# Patient Record
Sex: MALE | Race: WHITE
[De-identification: names, ages, dates, MRNs, and addresses within clinical notes are randomized per-mention and may not be internally consistent; named-entity substitution may affect disease eponyms.]

---

## 2018-06-14 NOTE — NUR
06/14/18 1377 Yazmin Davis
1573 PATIENT ARRIVES TO PACU ASLEEP, DOES NOT REPSOND TO PAIN OR
VERBAL STIMULI. RESP EVEN AND UNLABORED, ORAL AIRWAY IN PLACE, MASK AT
6 LITERS.

## 2018-06-14 NOTE — NUR
CHECKED ON PT FOLLOWING SURGERY. HE WAS SOMEWHAT GROGGY, BUT AWAKE ENOUGH TO
KNOW OF MY PRESENCE. GUARDS PRESENT, WILL FOLLOW AS NEEDED

## 2018-06-14 NOTE — NUR
PT UP TO BR W/OFFICER STANDBY. PT REPORTS SUCCESSFUL VOID AND REQ DC HOME. DC
INSTRUCTIONS ARE GIVEN AND PT VERBALIZES UNDERSTANDING. PT GETTING DRESSED
W/ASSIST FROM OFFICERS.

## 2018-06-19 NOTE — OR
[facility]
                                    [address], [place]  [address]
_________________________________________________________________________________________
                                                                 Signed   
 
 
DATE OF OPERATION:
06/14/2018
 
SURGEON:
Parker Huang MD
 
PREOPERATIVE DIAGNOSIS:
Painful retained hardware, right ankle.
 
POSTOPERATIVE DIAGNOSIS:
Painful retained hardware, right ankle.
 
PROCEDURE:
Removal buried screws x2.
 
ANESTHESIA:
General.
 
SPECIMENS AND COMPLICATIONS:
There were no specimens or complications.
 
TOURNIQUET TIME:
About 20 minutes.
 
WHAT WAS DONE:
The patient was taken to the operating room.  After anesthesia was induced and airway
secured, the patient was prepped and draped in the routine sterile fashion.  Fluoroscopy
was used to localize the two distal locking screws of his tibial nail.  Once we had
localized fluoroscopically, a small incision was made over each one.  We then used a tip
of 15-blade to excavate the 1st screw.  There was a little bony overgrowth so we did
need to take a small osteotome and chisel away a little proud bone.  We were then able
to remove the proximal screw without any difficulty.  We then again exposed the 2nd
screw head using the 15-blade, but then again had used a small osteotome to gently
remove some surrounding bone.  Once we had excreted the screw head we again were able to
remove the 2nd screw without any difficulty.  Examination of the screws indicated that
the tips were completely entire screws had been removed.  The wounds were gently
irrigated, closed in standard fashion.  A sterile dressing was applied.  The patient was
awakened to recovery room where he arrived in stable condition.  Following application
of bulky sterile dressings. 
Counts were correct and antibiotic protocols were followed.
 
 
 
    Electronically Signed By: PARKER HUANG MD  06/19/18 0755
_________________________________________________________________________________________
PATIENT NAME:     CRISTOBAL RAMIRES                   
MEDICAL RECORD #: [id number]            OPERATIVE REPORT              
          ACCT #: [id number]  
DATE OF BIRTH:   [birth year]            REPORT #: [id number]      
PHYSICIAN:        PARKER HUANG MD      
PCP:              RONDA MADRID MD              
REPORT IS CONFIDENTIAL AND NOT TO BE RELEASED WITHOUT AUTHORIZATION
 
 
                                  [address] [place]  [address]
_________________________________________________________________________________________
                                                                 Signed   
 
 
 
            ________________________________________
            Parker Huang MD 
 
[facility]
Job #:  [id number]
DD:  06/14/2018 07:51:33
DT:  06/14/2018 09:35:57
 
 
Copies:                                
~
 
 
 
 
 
 
 
 
 
 
 
 
 
 
 
 
 
 
 
 
 
 
 
 
 
 
 
 
 
 
 
    Electronically Signed By: PARKER HUANG MD  06/19/18 0755
_________________________________________________________________________________________
PATIENT NAME:     CRISTOBAL RAMIRES                   
MEDICAL RECORD #: [id number]            OPERATIVE REPORT              
          ACCT #: [id number]  
DATE OF BIRTH:   [birth year]            REPORT #: [id number]      
PHYSICIAN:        PARKER HUANG MD      
PCP:              RONDA MADRID MD              
REPORT IS CONFIDENTIAL AND NOT TO BE RELEASED WITHOUT AUTHORIZATION

## 2021-07-20 ENCOUNTER — HOSPITAL ENCOUNTER (EMERGENCY)
Dept: HOSPITAL 46 - ED | Age: 31
Discharge: HOME | End: 2021-07-20
Payer: COMMERCIAL

## 2021-07-20 VITALS — WEIGHT: 247.01 LBS | BODY MASS INDEX: 33.46 KG/M2 | HEIGHT: 72 IN

## 2021-07-20 DIAGNOSIS — Z87.891: ICD-10-CM

## 2021-07-20 DIAGNOSIS — Z79.4: ICD-10-CM

## 2021-07-20 DIAGNOSIS — Z79.891: ICD-10-CM

## 2021-07-20 DIAGNOSIS — E10.65: Primary | ICD-10-CM

## 2021-07-20 DIAGNOSIS — Z79.899: ICD-10-CM

## 2021-07-20 DIAGNOSIS — Z88.2: ICD-10-CM

## 2022-08-15 ENCOUNTER — HOSPITAL ENCOUNTER (EMERGENCY)
Dept: HOSPITAL 46 - ED | Age: 32
Discharge: HOME | End: 2022-08-15
Payer: COMMERCIAL

## 2022-08-15 VITALS — BODY MASS INDEX: 33.03 KG/M2 | HEIGHT: 72 IN | WEIGHT: 243.83 LBS

## 2022-08-15 DIAGNOSIS — Z79.899: ICD-10-CM

## 2022-08-15 DIAGNOSIS — Z87.891: ICD-10-CM

## 2022-08-15 DIAGNOSIS — G43.909: Primary | ICD-10-CM

## 2022-08-15 DIAGNOSIS — Z88.2: ICD-10-CM

## 2022-08-15 DIAGNOSIS — E10.9: ICD-10-CM

## 2024-11-02 ENCOUNTER — HOSPITAL ENCOUNTER (INPATIENT)
Dept: HOSPITAL 46 - ED | Age: 34
LOS: 2 days | Discharge: NURSING, REHAB OR LONG-TERM CARE | DRG: 310 | End: 2024-11-04
Attending: FAMILY MEDICINE | Admitting: FAMILY MEDICINE
Payer: COMMERCIAL

## 2024-11-02 VITALS — DIASTOLIC BLOOD PRESSURE: 67 MMHG | SYSTOLIC BLOOD PRESSURE: 140 MMHG

## 2024-11-02 VITALS — WEIGHT: 262.79 LBS | HEIGHT: 72 IN | BODY MASS INDEX: 35.59 KG/M2

## 2024-11-02 DIAGNOSIS — Z79.899: ICD-10-CM

## 2024-11-02 DIAGNOSIS — E10.9: ICD-10-CM

## 2024-11-02 DIAGNOSIS — R00.1: ICD-10-CM

## 2024-11-02 DIAGNOSIS — I45.5: Primary | ICD-10-CM

## 2024-11-02 DIAGNOSIS — Z88.2: ICD-10-CM

## 2024-11-02 DIAGNOSIS — Z79.4: ICD-10-CM

## 2024-11-02 DIAGNOSIS — G43.909: ICD-10-CM

## 2024-11-02 LAB
ALBUMIN SERPL-MCNC: 3.4 G/DL (ref 3.4–5)
ALBUMIN/GLOB SERPL: 0.94 {RATIO} (ref 1.1–2.4)
ALP SERPL-CCNC: 60 U/L (ref 46–116)
ALT SERPL W P-5'-P-CCNC: 36 U/L (ref 14–59)
ANION GAP SERPL CALCULATED.4IONS-SCNC: 8.1 MMOL/L (ref 7–21)
APTT PPP: 29.7 SEC (ref 22.9–41.3)
AST SERPL-CCNC: 25 U/L (ref 15–37)
BASOPHILS NFR BLD AUTO: 0.4 % (ref 0–2)
BUN SERPL-MCNC: 17 MG/DL (ref 7–18)
BUN/CREAT SERPL: 11.4 (ref 6–28.6)
CALCIUM SERPL-MCNC: 9.2 MG/DL (ref 8.5–10.1)
CHLORIDE SERPL-SCNC: 102 MMOL/L (ref 98–107)
CO2 SERPL-SCNC: 28 MMOL/L (ref 21–32)
DEPRECATED RDW RBC AUTO: 13.2 FL (ref 10.5–15)
EGFRCR SERPLBLD CKD-EPI 2021: 63 ML/MIN (ref 60–?)
EOSINOPHIL NFR BLD AUTO: 3.5 % (ref 0–6)
GLOBULIN SER-MCNC: 3.6 G/DL (ref 1.8–3.5)
HCT VFR BLD AUTO: 42.8 % (ref 35–50)
HGB BLD-MCNC: 14.8 G/DL (ref 12–18)
INR PPP: 1.12 (ref 0.8–1.3)
LYMPHOCYTES NFR BLD AUTO: 38.2 % (ref 24–44)
MAGNESIUM SERPL-MCNC: 1.8 MG/DL (ref 1.8–2.4)
MCH RBC QN AUTO: 30.2 PG (ref 27–36)
MCHC RBC AUTO-ENTMCNC: 34.5 G/DL (ref 30–36)
MCV RBC AUTO: 87.5 FL (ref 81–99)
MONOCYTES NFR BLD AUTO: 13.6 % (ref 0–12)
NEUTROPHILS NFR BLD AUTO: 44.3 % (ref 39–80)
PLATELET # BLD AUTO: 336 K/UL (ref 140–440)
POTASSIUM SERPL-SCNC: 5.1 MMOL/L (ref 3.5–5.1)
PROT SERPL-MCNC: 7 G/DL (ref 6.4–8.2)
PROTHROMBIN TIME: 13.7 SEC (ref 11.2–14.2)
RBC # BLD AUTO: 4.89 M/UL (ref 4.3–5.7)

## 2024-11-02 PROCEDURE — G0378 HOSPITAL OBSERVATION PER HR: HCPCS

## 2024-11-02 PROCEDURE — A9270 NON-COVERED ITEM OR SERVICE: HCPCS

## 2024-11-03 VITALS — DIASTOLIC BLOOD PRESSURE: 47 MMHG | SYSTOLIC BLOOD PRESSURE: 107 MMHG

## 2024-11-03 VITALS — DIASTOLIC BLOOD PRESSURE: 69 MMHG | SYSTOLIC BLOOD PRESSURE: 111 MMHG

## 2024-11-03 VITALS — SYSTOLIC BLOOD PRESSURE: 109 MMHG | DIASTOLIC BLOOD PRESSURE: 49 MMHG

## 2024-11-03 VITALS — SYSTOLIC BLOOD PRESSURE: 119 MMHG | DIASTOLIC BLOOD PRESSURE: 74 MMHG

## 2024-11-03 VITALS — SYSTOLIC BLOOD PRESSURE: 113 MMHG | DIASTOLIC BLOOD PRESSURE: 52 MMHG

## 2024-11-03 VITALS — DIASTOLIC BLOOD PRESSURE: 58 MMHG | SYSTOLIC BLOOD PRESSURE: 119 MMHG

## 2024-11-03 VITALS — DIASTOLIC BLOOD PRESSURE: 55 MMHG | SYSTOLIC BLOOD PRESSURE: 112 MMHG

## 2024-11-03 VITALS — SYSTOLIC BLOOD PRESSURE: 119 MMHG | DIASTOLIC BLOOD PRESSURE: 62 MMHG

## 2024-11-03 LAB
ALBUMIN SERPL-MCNC: 3.1 G/DL (ref 3.4–5)
ALBUMIN/GLOB SERPL: 0.86 {RATIO} (ref 1.1–2.4)
ALP SERPL-CCNC: 59 U/L (ref 46–116)
ALT SERPL W P-5'-P-CCNC: 30 U/L (ref 14–59)
ANION GAP SERPL CALCULATED.4IONS-SCNC: 10.1 MMOL/L (ref 7–21)
AST SERPL-CCNC: 21 U/L (ref 15–37)
BASOPHILS NFR BLD AUTO: 0.4 % (ref 0–2)
BUN SERPL-MCNC: 19 MG/DL (ref 7–18)
BUN/CREAT SERPL: 14.39 (ref 6–28.6)
CALCIUM SERPL-MCNC: 9.1 MG/DL (ref 8.5–10.1)
CHLORIDE SERPL-SCNC: 103 MMOL/L (ref 98–107)
CO2 SERPL-SCNC: 25 MMOL/L (ref 21–32)
DEPRECATED RDW RBC AUTO: 13.1 FL (ref 10.5–15)
EGFRCR SERPLBLD CKD-EPI 2021: 73 ML/MIN (ref 60–?)
EOSINOPHIL NFR BLD AUTO: 4.2 % (ref 0–6)
GLOBULIN SER-MCNC: 3.6 G/DL (ref 1.8–3.5)
HCT VFR BLD AUTO: 42.8 % (ref 35–50)
HGB BLD-MCNC: 15 G/DL (ref 12–18)
LYMPHOCYTES NFR BLD AUTO: 45 % (ref 24–44)
MAGNESIUM SERPL-MCNC: 1.8 MG/DL (ref 1.8–2.4)
MCH RBC QN AUTO: 30.2 PG (ref 27–36)
MCHC RBC AUTO-ENTMCNC: 35 G/DL (ref 30–36)
MCV RBC AUTO: 86.3 FL (ref 81–99)
MONOCYTES NFR BLD AUTO: 12.5 % (ref 0–12)
NEUTROPHILS NFR BLD AUTO: 37.9 % (ref 39–80)
PLATELET # BLD AUTO: 275 K/UL (ref 140–440)
POTASSIUM SERPL-SCNC: 4.1 MMOL/L (ref 3.5–5.1)
PROT SERPL-MCNC: 6.7 G/DL (ref 6.4–8.2)
RBC # BLD AUTO: 4.97 M/UL (ref 4.3–5.7)

## 2024-11-04 VITALS — DIASTOLIC BLOOD PRESSURE: 70 MMHG | SYSTOLIC BLOOD PRESSURE: 130 MMHG

## 2024-11-04 VITALS — DIASTOLIC BLOOD PRESSURE: 74 MMHG | SYSTOLIC BLOOD PRESSURE: 130 MMHG

## 2024-11-04 VITALS — SYSTOLIC BLOOD PRESSURE: 110 MMHG | DIASTOLIC BLOOD PRESSURE: 62 MMHG

## 2024-11-04 VITALS — SYSTOLIC BLOOD PRESSURE: 129 MMHG | DIASTOLIC BLOOD PRESSURE: 80 MMHG

## 2024-11-04 LAB
ALBUMIN SERPL-MCNC: 3 G/DL (ref 3.4–5)
ALBUMIN/GLOB SERPL: 0.91 {RATIO} (ref 1.1–2.4)
ALP SERPL-CCNC: 55 U/L (ref 46–116)
ALT SERPL W P-5'-P-CCNC: 29 U/L (ref 14–59)
ANION GAP SERPL CALCULATED.4IONS-SCNC: 13.1 MMOL/L (ref 7–21)
AST SERPL-CCNC: 17 U/L (ref 15–37)
BASOPHILS NFR BLD AUTO: 0.6 % (ref 0–2)
BUN SERPL-MCNC: 15 MG/DL (ref 7–18)
BUN/CREAT SERPL: 11.27 (ref 6–28.6)
CALCIUM SERPL-MCNC: 8.8 MG/DL (ref 8.5–10.1)
CHLORIDE SERPL-SCNC: 105 MMOL/L (ref 98–107)
CO2 SERPL-SCNC: 26 MMOL/L (ref 21–32)
DEPRECATED RDW RBC AUTO: 13.3 FL (ref 10.5–15)
EGFRCR SERPLBLD CKD-EPI 2021: 72 ML/MIN (ref 60–?)
EOSINOPHIL NFR BLD AUTO: 4.7 % (ref 0–6)
GLOBULIN SER-MCNC: 3.3 G/DL (ref 1.8–3.5)
HCT VFR BLD AUTO: 41.5 % (ref 35–50)
HGB BLD-MCNC: 14.5 G/DL (ref 12–18)
LYMPHOCYTES NFR BLD AUTO: 44.6 % (ref 24–44)
MAGNESIUM SERPL-MCNC: 1.9 MG/DL (ref 1.8–2.4)
MCH RBC QN AUTO: 30 PG (ref 27–36)
MCHC RBC AUTO-ENTMCNC: 34.8 G/DL (ref 30–36)
MCV RBC AUTO: 86 FL (ref 81–99)
MONOCYTES NFR BLD AUTO: 11 % (ref 0–12)
NEUTROPHILS NFR BLD AUTO: 39.1 % (ref 39–80)
PHOSPHATE SERPL-MCNC: 2.5 MG/DL (ref 2.5–4.9)
PLATELET # BLD AUTO: 292 K/UL (ref 140–440)
POTASSIUM SERPL-SCNC: 4.1 MMOL/L (ref 3.5–5.1)
PROT SERPL-MCNC: 6.3 G/DL (ref 6.4–8.2)
RBC # BLD AUTO: 4.83 M/UL (ref 4.3–5.7)

## 2024-11-25 ENCOUNTER — HOSPITAL ENCOUNTER (EMERGENCY)
Dept: HOSPITAL 46 - ED | Age: 34
Discharge: HOME | End: 2024-11-25
Payer: COMMERCIAL

## 2024-11-25 VITALS — SYSTOLIC BLOOD PRESSURE: 105 MMHG | DIASTOLIC BLOOD PRESSURE: 79 MMHG

## 2024-11-25 VITALS — WEIGHT: 265.22 LBS | BODY MASS INDEX: 35.92 KG/M2 | HEIGHT: 72 IN

## 2024-11-25 DIAGNOSIS — Z79.899: ICD-10-CM

## 2024-11-25 DIAGNOSIS — E10.43: ICD-10-CM

## 2024-11-25 DIAGNOSIS — K31.84: ICD-10-CM

## 2024-11-25 DIAGNOSIS — Z87.891: ICD-10-CM

## 2024-11-25 DIAGNOSIS — G43.909: ICD-10-CM

## 2024-11-25 DIAGNOSIS — Z79.4: ICD-10-CM

## 2024-11-25 DIAGNOSIS — I49.3: Primary | ICD-10-CM

## 2024-11-25 DIAGNOSIS — Z88.2: ICD-10-CM

## 2024-11-25 DIAGNOSIS — E78.00: ICD-10-CM

## 2024-11-25 LAB
ALBUMIN SERPL-MCNC: 3.3 G/DL (ref 3.4–5)
ALBUMIN/GLOB SERPL: 1.03 {RATIO} (ref 1.1–2.4)
ALP SERPL-CCNC: 53 U/L (ref 46–116)
ALT SERPL W P-5'-P-CCNC: 40 U/L (ref 14–59)
ANION GAP SERPL CALCULATED.4IONS-SCNC: 13.7 MMOL/L (ref 7–21)
AST SERPL-CCNC: 31 U/L (ref 15–37)
BASOPHILS NFR BLD AUTO: 0.8 % (ref 0–2)
BUN SERPL-MCNC: 14 MG/DL (ref 7–18)
BUN/CREAT SERPL: 12.17 (ref 6–28.6)
CALCIUM SERPL-MCNC: 9 MG/DL (ref 8.5–10.1)
CHLORIDE SERPL-SCNC: 104 MMOL/L (ref 98–107)
CO2 SERPL-SCNC: 27 MMOL/L (ref 21–32)
DEPRECATED RDW RBC AUTO: 13.2 FL (ref 10.5–15)
EGFRCR SERPLBLD CKD-EPI 2021: 86 ML/MIN (ref 60–?)
EOSINOPHIL NFR BLD AUTO: 5.2 % (ref 0–6)
GLOBULIN SER-MCNC: 3.2 G/DL (ref 1.8–3.5)
HCT VFR BLD AUTO: 41.1 % (ref 35–50)
HGB BLD-MCNC: 14 G/DL (ref 12–18)
HGB UR QL STRIP: NEGATIVE
KETONES UR QL STRIP: NEGATIVE
LEUKOCYTE ESTERASE UR QL STRIP: NEGATIVE
LYMPHOCYTES NFR BLD AUTO: 52.7 % (ref 24–44)
MAGNESIUM SERPL-MCNC: 1.8 MG/DL (ref 1.8–2.4)
MCH RBC QN AUTO: 29.8 PG (ref 27–36)
MCHC RBC AUTO-ENTMCNC: 34.1 G/DL (ref 30–36)
MCV RBC AUTO: 87.2 FL (ref 81–99)
MONOCYTES NFR BLD AUTO: 11.4 % (ref 0–12)
NEUTROPHILS NFR BLD AUTO: 29.9 % (ref 39–80)
NITRITE UR QL STRIP: NEGATIVE
PLATELET # BLD AUTO: 315 K/UL (ref 140–440)
POTASSIUM SERPL-SCNC: 3.7 MMOL/L (ref 3.5–5.1)
PROT SERPL-MCNC: 6.5 G/DL (ref 6.4–8.2)
RBC # BLD AUTO: 4.71 M/UL (ref 4.3–5.7)

## 2025-01-04 ENCOUNTER — HOSPITAL ENCOUNTER (EMERGENCY)
Dept: HOSPITAL 46 - ED | Age: 35
Discharge: HOME | End: 2025-01-04
Payer: COMMERCIAL

## 2025-01-04 VITALS — HEIGHT: 72 IN | BODY MASS INDEX: 34.54 KG/M2 | WEIGHT: 255 LBS

## 2025-01-04 VITALS — SYSTOLIC BLOOD PRESSURE: 113 MMHG | DIASTOLIC BLOOD PRESSURE: 57 MMHG

## 2025-01-04 DIAGNOSIS — E10.43: ICD-10-CM

## 2025-01-04 DIAGNOSIS — I49.3: Primary | ICD-10-CM

## 2025-01-04 DIAGNOSIS — Z87.891: ICD-10-CM

## 2025-01-04 DIAGNOSIS — Z79.4: ICD-10-CM

## 2025-01-04 DIAGNOSIS — Z88.2: ICD-10-CM

## 2025-01-04 DIAGNOSIS — K31.84: ICD-10-CM

## 2025-01-04 LAB
ALBUMIN SERPL-MCNC: 3.3 G/DL (ref 3.4–5)
ALBUMIN/GLOB SERPL: 0.97 {RATIO} (ref 1.1–2.4)
ALP SERPL-CCNC: 59 U/L (ref 46–116)
ALT SERPL W P-5'-P-CCNC: 35 U/L (ref 14–59)
ANION GAP SERPL CALCULATED.4IONS-SCNC: 12 MMOL/L (ref 7–21)
AST SERPL-CCNC: 30 U/L (ref 15–37)
BASOPHILS NFR BLD AUTO: 0.7 % (ref 0–2)
BUN SERPL-MCNC: 22 MG/DL (ref 7–18)
BUN/CREAT SERPL: 15.38 (ref 6–28.6)
CALCIUM SERPL-MCNC: 9 MG/DL (ref 8.5–10.1)
CHLORIDE SERPL-SCNC: 102 MMOL/L (ref 98–107)
CO2 SERPL-SCNC: 28 MMOL/L (ref 21–32)
DEPRECATED RDW RBC AUTO: 12.9 FL (ref 10.5–15)
EGFRCR SERPLBLD CKD-EPI 2021: 66 ML/MIN (ref 60–?)
EOSINOPHIL NFR BLD AUTO: 5.1 % (ref 0–6)
GLOBULIN SER-MCNC: 3.4 G/DL (ref 1.8–3.5)
HCT VFR BLD AUTO: 40.2 % (ref 35–50)
HGB BLD-MCNC: 13.8 G/DL (ref 12–18)
LYMPHOCYTES NFR BLD AUTO: 44.7 % (ref 24–44)
MAGNESIUM SERPL-MCNC: 1.7 MG/DL (ref 1.8–2.4)
MCH RBC QN AUTO: 29.4 PG (ref 27–36)
MCHC RBC AUTO-ENTMCNC: 34.4 G/DL (ref 30–36)
MCV RBC AUTO: 85.5 FL (ref 81–99)
MONOCYTES NFR BLD AUTO: 12.1 % (ref 0–12)
NEUTROPHILS NFR BLD AUTO: 37.4 % (ref 39–80)
PLATELET # BLD AUTO: 301 K/UL (ref 140–440)
POTASSIUM SERPL-SCNC: 4 MMOL/L (ref 3.5–5.1)
PROT SERPL-MCNC: 6.7 G/DL (ref 6.4–8.2)
RBC # BLD AUTO: 4.7 M/UL (ref 4.3–5.7)

## 2025-02-12 ENCOUNTER — HOSPITAL ENCOUNTER (EMERGENCY)
Dept: HOSPITAL 46 - ED | Age: 35
Discharge: HOME | End: 2025-02-12
Payer: COMMERCIAL

## 2025-02-12 VITALS — BODY MASS INDEX: 34.13 KG/M2 | HEIGHT: 72 IN | WEIGHT: 252 LBS

## 2025-02-12 VITALS — SYSTOLIC BLOOD PRESSURE: 122 MMHG | DIASTOLIC BLOOD PRESSURE: 78 MMHG

## 2025-02-12 DIAGNOSIS — K31.84: ICD-10-CM

## 2025-02-12 DIAGNOSIS — E10.43: Primary | ICD-10-CM

## 2025-02-12 DIAGNOSIS — E78.00: ICD-10-CM

## 2025-02-12 DIAGNOSIS — Z79.4: ICD-10-CM

## 2025-02-12 DIAGNOSIS — G43.909: ICD-10-CM

## 2025-02-12 DIAGNOSIS — Z88.2: ICD-10-CM

## 2025-02-12 DIAGNOSIS — Z88.8: ICD-10-CM

## 2025-02-12 DIAGNOSIS — Z79.899: ICD-10-CM

## 2025-02-12 DIAGNOSIS — Z87.891: ICD-10-CM

## 2025-02-12 LAB
ALBUMIN SERPL-MCNC: 3.7 G/DL (ref 3.4–5)
ALBUMIN/GLOB SERPL: 1.16 {RATIO} (ref 1.1–2.4)
ALP SERPL-CCNC: 64 U/L (ref 46–116)
ALT SERPL W P-5'-P-CCNC: 39 U/L (ref 14–59)
ANION GAP SERPL CALCULATED.4IONS-SCNC: 13.8 MMOL/L (ref 7–21)
AST SERPL-CCNC: 37 U/L (ref 15–37)
BASOPHILS NFR BLD AUTO: 1.5 % (ref 0–2)
BUN SERPL-MCNC: 15 MG/DL (ref 7–18)
BUN/CREAT SERPL: 12.82 (ref 6–28.6)
CALCIUM SERPL-MCNC: 9.2 MG/DL (ref 8.5–10.1)
CHLORIDE SERPL-SCNC: 105 MMOL/L (ref 98–107)
CO2 SERPL-SCNC: 27 MMOL/L (ref 21–32)
DEPRECATED RDW RBC AUTO: 13.2 FL (ref 10.5–15)
EGFRCR SERPLBLD CKD-EPI 2021: 84 ML/MIN (ref 60–?)
EOSINOPHIL NFR BLD AUTO: 2.6 % (ref 0–6)
GLOBULIN SER-MCNC: 3.2 G/DL (ref 1.8–3.5)
HCT VFR BLD AUTO: 40.4 % (ref 35–50)
HGB BLD-MCNC: 13.5 G/DL (ref 12–18)
HGB UR QL STRIP: NEGATIVE
KETONES UR QL STRIP: NEGATIVE
LEUKOCYTE ESTERASE UR QL STRIP: NEGATIVE
LYMPHOCYTES NFR BLD AUTO: 30.1 % (ref 24–44)
MCH RBC QN AUTO: 28.3 PG (ref 27–36)
MCHC RBC AUTO-ENTMCNC: 33.5 G/DL (ref 30–36)
MCV RBC AUTO: 84.6 FL (ref 81–99)
MONOCYTES NFR BLD AUTO: 6.3 % (ref 0–12)
NEUTROPHILS NFR BLD AUTO: 59.5 % (ref 39–80)
NITRITE UR QL STRIP: NEGATIVE
PLATELET # BLD AUTO: 301 K/UL (ref 140–440)
POTASSIUM SERPL-SCNC: 3.8 MMOL/L (ref 3.5–5.1)
PROT SERPL-MCNC: 6.9 G/DL (ref 6.4–8.2)
RBC # BLD AUTO: 4.77 M/UL (ref 4.3–5.7)
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
## 2025-02-17 ENCOUNTER — HOSPITAL ENCOUNTER (EMERGENCY)
Dept: HOSPITAL 46 - ED | Age: 35
LOS: 1 days | Discharge: HOME | End: 2025-02-18
Payer: COMMERCIAL

## 2025-02-17 VITALS — HEIGHT: 72 IN | WEIGHT: 248 LBS | BODY MASS INDEX: 33.59 KG/M2

## 2025-02-17 DIAGNOSIS — Z87.891: ICD-10-CM

## 2025-02-17 DIAGNOSIS — Z79.899: ICD-10-CM

## 2025-02-17 DIAGNOSIS — E78.00: ICD-10-CM

## 2025-02-17 DIAGNOSIS — Z88.2: ICD-10-CM

## 2025-02-17 DIAGNOSIS — K31.84: ICD-10-CM

## 2025-02-17 DIAGNOSIS — E10.43: ICD-10-CM

## 2025-02-17 DIAGNOSIS — Z79.4: ICD-10-CM

## 2025-02-17 DIAGNOSIS — Z88.8: ICD-10-CM

## 2025-02-17 DIAGNOSIS — G43.909: ICD-10-CM

## 2025-02-17 DIAGNOSIS — E10.649: Primary | ICD-10-CM

## 2025-02-17 LAB
ALBUMIN SERPL-MCNC: 4.3 G/DL (ref 3.4–5)
ALBUMIN/GLOB SERPL: 1.23 {RATIO} (ref 1.1–2.4)
ALP SERPL-CCNC: 71 U/L (ref 46–116)
ALT SERPL W P-5'-P-CCNC: 35 U/L (ref 14–59)
ANION GAP SERPL CALCULATED.4IONS-SCNC: 14.9 MMOL/L (ref 7–21)
AST SERPL-CCNC: 32 U/L (ref 15–37)
BASOPHILS NFR BLD AUTO: 0.5 % (ref 0–2)
BUN SERPL-MCNC: 18 MG/DL (ref 7–18)
BUN/CREAT SERPL: 13.13 (ref 6–28.6)
CALCIUM SERPL-MCNC: 9.7 MG/DL (ref 8.5–10.1)
CHLORIDE SERPL-SCNC: 108 MMOL/L (ref 98–107)
CO2 SERPL-SCNC: 28 MMOL/L (ref 21–32)
DEPRECATED RDW RBC AUTO: 13.5 FL (ref 10.5–15)
EGFRCR SERPLBLD CKD-EPI 2021: 69 ML/MIN (ref 60–?)
EOSINOPHIL NFR BLD AUTO: 4.1 % (ref 0–6)
GLOBULIN SER-MCNC: 3.5 G/DL (ref 1.8–3.5)
HCT VFR BLD AUTO: 44.5 % (ref 35–50)
HGB BLD-MCNC: 15.1 G/DL (ref 12–18)
LYMPHOCYTES NFR BLD AUTO: 41.2 % (ref 24–44)
MCH RBC QN AUTO: 29 PG (ref 27–36)
MCHC RBC AUTO-ENTMCNC: 34 G/DL (ref 30–36)
MCV RBC AUTO: 85.4 FL (ref 81–99)
MONOCYTES NFR BLD AUTO: 9.1 % (ref 0–12)
NEUTROPHILS NFR BLD AUTO: 45.1 % (ref 39–80)
PLATELET # BLD AUTO: 379 K/UL (ref 140–440)
POTASSIUM SERPL-SCNC: 3.9 MMOL/L (ref 3.5–5.1)
PROT SERPL-MCNC: 7.8 G/DL (ref 6.4–8.2)
RBC # BLD AUTO: 5.21 M/UL (ref 4.3–5.7)
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
## 2025-02-18 VITALS — SYSTOLIC BLOOD PRESSURE: 111 MMHG | DIASTOLIC BLOOD PRESSURE: 75 MMHG

## 2025-05-02 ENCOUNTER — HOSPITAL ENCOUNTER (EMERGENCY)
Dept: HOSPITAL 46 - ED | Age: 35
Discharge: HOME | End: 2025-05-02
Payer: COMMERCIAL

## 2025-05-02 VITALS — DIASTOLIC BLOOD PRESSURE: 74 MMHG | SYSTOLIC BLOOD PRESSURE: 136 MMHG

## 2025-05-02 DIAGNOSIS — Z88.2: ICD-10-CM

## 2025-05-02 DIAGNOSIS — Z79.899: ICD-10-CM

## 2025-05-02 DIAGNOSIS — Z87.891: ICD-10-CM

## 2025-05-02 DIAGNOSIS — Z79.84: ICD-10-CM

## 2025-05-02 DIAGNOSIS — E10.65: Primary | ICD-10-CM

## 2025-05-02 LAB
ALBUMIN SERPL-MCNC: 3.5 G/DL (ref 3.4–5)
ALBUMIN/GLOB SERPL: 1.17 {RATIO} (ref 1.1–2.4)
ANION GAP SERPL CALCULATED.4IONS-SCNC: 11.3 MMOL/L (ref 7–21)
BASOPHILS NFR BLD AUTO: 0.2 % (ref 0–2)
BUN/CREAT SERPL: 14.36 (ref 6–28.6)
CALCIUM SERPL-MCNC: 8.6 MG/DL (ref 8.5–10.1)
DEPRECATED RDW RBC AUTO: 13.6 FL (ref 10.5–15)
EOSINOPHIL NFR BLD AUTO: 1.1 % (ref 0–6)
HCT VFR BLD AUTO: 34.5 % (ref 35–50)
HGB BLD-MCNC: 11.9 G/DL (ref 12–18)
HGB UR QL STRIP: NEGATIVE
KETONES UR QL STRIP: NEGATIVE
LEUKOCYTE ESTERASE UR QL STRIP: NEGATIVE
LYMPHOCYTES NFR BLD AUTO: 28.8 % (ref 24–44)
MCH RBC QN AUTO: 28.8 PG (ref 27–36)
MCHC RBC AUTO-ENTMCNC: 34.6 G/DL (ref 30–36)
MCV RBC AUTO: 83.3 FL (ref 81–99)
MONOCYTES NFR BLD AUTO: 10.4 % (ref 0–12)
NEUTROPHILS NFR BLD AUTO: 59.5 % (ref 39–80)
NITRITE UR QL STRIP: NEGATIVE
PH BLDV: 7.31 [PH] (ref 7.31–7.41)
PLATELET # BLD AUTO: 289 K/UL (ref 140–440)
POTASSIUM SERPL-SCNC: 4.3 MMOL/L (ref 3.5–5.1)
PROT SERPL-MCNC: 6.5 G/DL (ref 6.4–8.2)
RBC # BLD AUTO: 4.14 M/UL (ref 4.3–5.7)